# Patient Record
Sex: FEMALE | Race: WHITE | NOT HISPANIC OR LATINO | Employment: PART TIME | URBAN - METROPOLITAN AREA
[De-identification: names, ages, dates, MRNs, and addresses within clinical notes are randomized per-mention and may not be internally consistent; named-entity substitution may affect disease eponyms.]

---

## 2018-09-05 PROBLEM — Z34.90 TERM PREGNANCY: Status: ACTIVE | Noted: 2018-09-05

## 2018-09-05 PROBLEM — Z67.40 TYPE O BLOOD, RH POSITIVE: Status: ACTIVE | Noted: 2018-09-05

## 2018-09-06 PROBLEM — Z34.90 PREGNANCY: Status: ACTIVE | Noted: 2018-09-06

## 2018-10-09 ENCOUNTER — HOSPITAL ENCOUNTER (OUTPATIENT)
Dept: PREADMISSION TESTING | Facility: HOSPITAL | Age: 28
Discharge: HOME | End: 2018-10-09
Attending: SURGERY
Payer: MEDICAID

## 2018-10-09 ENCOUNTER — ANESTHESIA EVENT (OUTPATIENT)
Dept: SURGERY | Facility: HOSPITAL | Age: 28
End: 2018-10-09
Payer: MEDICAID

## 2018-10-09 VITALS — BODY MASS INDEX: 35.3 KG/M2 | HEIGHT: 61 IN | WEIGHT: 187 LBS

## 2018-10-09 NOTE — DISCHARGE INSTRUCTIONS
[facility]  Pre Admit Instructions    Day and Date of Procedure: Wednesday 10-10-18  Arrival time: 8am      · Call your doctor if you become ill before your surgery       - 7 a.m. To 5 p.m. Enter through Patient Registration Main Lobby  · You must have a responsible  to bring you home    Do NOT eat or drink anything   past midnight before your procedure day    Please    · Do not wear makeup, jewelry, nail polish or body piercings  · Bring containers/solution for contacts, dentures, bridges - these and hearing aids will be removed before your procedure  · Do not bring cash, jewelry or valuables the day of your procedure   · No smoking at least 24 hours before your procedure  · Wear clothing that is comfortable and easy to take off and put on  · Do NOT shave for at least 5 days before your surgery    Review skin preparation handout before using. Shower with Hibiclens the Night before the procedure. Bring remaining Hibiclens with you the morning of surgery.                Information about your stay (Please Review)    Before Surgery  1. Cafeteria Meals: 7am to 10am; 11am to 1:30 pm; Dinner/Supper must may be ordered between 11:00 am and 4 pm from the [facility] After [employer] Menu. Food will be available to  between 5 pm and 6 pm. The kitchen phone extension is [phone].  2. Your doctor may order and review labs, x-rays, ECG or other tests as a pre-surgery workup and will call you if there is need for follow up.  3. No smoking inside or outside the hospital on hospital grounds.  4. Wear clothing that is easy to take off and put on.  The hospital will provide you with a gown.  5. You may bring robe, slippers, nightwear, and toiletries (toothbrush, toothpaste, makeup).  6. If your doctor orders a Fleets Enema or other prep, follow package and/or doctors orders.  7. Brush your teeth and rinse your mouth the morning of surgery, but dont swallow the water.  8. The nurse will ask questions and check your  condition.  The doctor may mariama your surgical site.  9. Compression boots may be put on your calves to reduce the risk of blood clots.  10. The doctor may order medicine to help you relax before surgery.  After Surgery  1. The nurse will check your temperature, breathing, blood pressure, heart rate, IV site, and surgery site.  2. A diet will be ordered-most start with ice chips and then advance slowly to other foods.  3. If you have IV fluids the IV pump will beep to let the nurse know that she needs to check it.  4. You may have a urinary catheter and staff may measure your oral intake and urine output.  5. Pain medication may be ordered by the doctor after surgery.  If you have a pain management device tell your caretakers not to press the button because of OVERDOSE RISK.  6. When the nurse or doctor tells you it is okay to get out of bed, ask for help until you are stable.  7. The nurse may ask you to turn, cough, and deep breathe to prevent lung problems.  You can use a pillow to hold your incision when you deep breathe or cough to reduce pain.  8. The nurse will give you discharge instructions--incision care, symptoms to report to your doctor, and your follow-up appointment when you are discharged.  You cannot drive yourself home.  Goal for Discharge from One Day Surgery  · Control pain with an oral medication  · Walk without feeling dizzy or weak  · Tolerate liquids well  · Urinate without difficulty    Things you can do to  Reduce the Risk of Infections or Complications  Wash Hands and use Waterless Hand Sanitizers  · Wash hands frequently with soap and warm water for at least 15 seconds.   · Use hand sanitizers (alcohol based) often at home and in public if hands are not visibly soiled  Take Antibiotic Exactly as Prescribed  · Do not stop antibiotics too soon; you risk developing infection resistant to antibiotics  · Take your antibiotic even if you are feeling better and even if they upset your  stomach  · Call the doctor if you cant tolerate the antibiotic or you have an allergic reaction  Stay Healthy  · Take medicines as prescribed by your doctor  · Keep your diabetes under control - diet and medication  · Get enough rest, exercise and eat a healthy diet  Keep the Wound Clean and Dry  · Wash hands before and after taking care of the incision (cut)  · Wash hands when you remove a dressing, before you touch/apply a new dressing  · Shower and clean incision with antibacterial soap and rinse well if the doctor approves  · Allow the cut to dry completely before putting on a clean dressing  · Do not touch the part of the bandage that will cover the incision  · Do not use ointments unless your doctor tells you to-can promote bacterial growth  · If ordered, put ointment directly on the dressing-do not touch the end of the tube  · Do not scrub, remove scabs, or leave a damp dressing on the incision  · Do not use peroxide or alcohol to clean the incision unless the doctor tells you to   · Do not let children, pets or anyone else contaminate the incision  Stop Smoking To Prevent Infection  · Stop smoking-Centers for Disease Control recommends 30 days before surgery  · Smokers get more infections after surgery-studies have shown 6 times the risk  · Smokers have more scarring and heal slower-open wounds get infected easier  Prevent Respiratory complications  · Stop smoking  · Turn, cough, and deep breathe even if you have some pain when you do so.  · Splint your incision with a pillow when you cough/deep breath, to help control pain.  · Do not lie in one position for long periods of time.   Prevent Blood Clots  · When you wake move your legs, flex your feet, rotate your ankles, wiggle your toes  · Get up when the doctor says its ok.  Dangle your feet from the side of the bed  · Report symptoms-leg pain, redness/swelling, warm to touch; fever; shortness of breath, chest pain, severe upper back pain.

## 2018-10-09 NOTE — ANESTHESIA PREPROCEDURE EVALUATION
10/09/2018  Umm Burks is a 28 y.o., female.    Anesthesia Evaluation    I have reviewed the Patient Summary Reports.    I have reviewed the Nursing Notes.   I have reviewed the Medications.     Review of Systems  Anesthesia Hx:  No problems with previous Anesthesia  History of prior surgery of interest to airway management or planning: Previous anesthesia: General  Denies Personal Hx of Anesthesia complications.   Social:  Non-Smoker, No Alcohol Use    Hematology/Oncology:  Hematology Normal   Oncology Normal     EENT/Dental:EENT/Dental Normal   Cardiovascular:  Cardiovascular Normal Exercise tolerance: good     Pulmonary:  Pulmonary Normal    Renal/:   renal calculi    Hepatic/GI:   GERD, poorly controlled Gall stones   Musculoskeletal:   Arthritis  OA knee   Neurological:  Neurology Normal    Endocrine:  Endocrine Normal    Dermatological:  Skin Normal    Psych:  Psychiatric Normal           Physical Exam  General:  Obesity    Airway/Jaw/Neck:  Airway Findings: Mouth Opening: Normal Tongue: Normal  General Airway Assessment: Adult  Mallampati: II  TM Distance: Normal, at least 6 cm  Jaw/Neck Findings:     Neck ROM: Normal ROM      Dental:  Dental Findings: In tact        Mental Status:  Mental Status Findings:  Cooperative, Alert and Oriented         Anesthesia Plan  Type of Anesthesia, risks & benefits discussed:  Anesthesia Type:  general  Patient's Preference:   Intra-op Monitoring Plan: standard ASA monitors  Intra-op Monitoring Plan Comments:   Post Op Pain Control Plan: multimodal analgesia  Post Op Pain Control Plan Comments:   Induction:   IV  Beta Blocker:  Patient is not currently on a Beta-Blocker (No further documentation required).       Informed Consent: Patient understands risks and agrees with Anesthesia plan.  Questions answered. Anesthesia consent signed with patient.  ASA Score: 2      Day of Surgery Review of History & Physical: I have interviewed and examined the patient. I have reviewed the patient's H&P dated: 10/10/18. There are no significant changes.  H&P update referred to the surgeon.     Anesthesia Plan Notes: Pt admits to severe GERD, suggest RSI        Ready For Surgery From Anesthesia Perspective.

## 2018-10-10 ENCOUNTER — HOSPITAL ENCOUNTER (OUTPATIENT)
Facility: HOSPITAL | Age: 28
Discharge: HOME | End: 2018-10-10
Attending: SURGERY | Admitting: SURGERY
Payer: MEDICAID

## 2018-10-10 ENCOUNTER — ANESTHESIA (OUTPATIENT)
Dept: SURGERY | Facility: HOSPITAL | Age: 28
End: 2018-10-10
Payer: MEDICAID

## 2018-10-10 VITALS
OXYGEN SATURATION: 97 % | RESPIRATION RATE: 18 BRPM | TEMPERATURE: 97 F | HEIGHT: 61 IN | WEIGHT: 184.88 LBS | SYSTOLIC BLOOD PRESSURE: 112 MMHG | HEART RATE: 64 BPM | DIASTOLIC BLOOD PRESSURE: 65 MMHG | BODY MASS INDEX: 34.91 KG/M2

## 2018-10-10 DIAGNOSIS — K81.1 CHRONIC CHOLECYSTITIS: Primary | ICD-10-CM

## 2018-10-10 LAB — B-HCG UR QL: NEGATIVE

## 2018-10-10 PROCEDURE — 00790 ANES IPER UPR ABD NOS: CPT | Performed by: SURGERY

## 2018-10-10 PROCEDURE — 37000008 HC ANESTHESIA 1ST 15 MINUTES: Performed by: SURGERY

## 2018-10-10 PROCEDURE — 63600175 PHARM REV CODE 636 W HCPCS: Performed by: SURGERY

## 2018-10-10 PROCEDURE — 90471 IMMUNIZATION ADMIN: CPT | Performed by: SURGERY

## 2018-10-10 PROCEDURE — 37000009 HC ANESTHESIA EA ADD 15 MINS: Performed by: SURGERY

## 2018-10-10 PROCEDURE — 88304 TISSUE EXAM BY PATHOLOGIST: CPT | Performed by: PATHOLOGY

## 2018-10-10 PROCEDURE — 81025 URINE PREGNANCY TEST: CPT

## 2018-10-10 PROCEDURE — 25000003 PHARM REV CODE 250: Performed by: SURGERY

## 2018-10-10 PROCEDURE — 36000708 HC OR TIME LEV III 1ST 15 MIN: Performed by: SURGERY

## 2018-10-10 PROCEDURE — 25000003 PHARM REV CODE 250

## 2018-10-10 PROCEDURE — 36000709 HC OR TIME LEV III EA ADD 15 MIN: Performed by: SURGERY

## 2018-10-10 PROCEDURE — 00790 ANES IPER UPR ABD NOS: CPT | Mod: QZ | Performed by: NURSE ANESTHETIST, CERTIFIED REGISTERED

## 2018-10-10 PROCEDURE — 90686 IIV4 VACC NO PRSV 0.5 ML IM: CPT | Performed by: SURGERY

## 2018-10-10 PROCEDURE — 71000033 HC RECOVERY, INTIAL HOUR: Performed by: SURGERY

## 2018-10-10 PROCEDURE — 27201423 OPTIME MED/SURG SUP & DEVICES STERILE SUPPLY: Performed by: SURGERY

## 2018-10-10 PROCEDURE — 88304 TISSUE EXAM BY PATHOLOGIST: CPT | Mod: 26,,, | Performed by: PATHOLOGY

## 2018-10-10 PROCEDURE — 63600175 PHARM REV CODE 636 W HCPCS

## 2018-10-10 RX ORDER — SODIUM CHLORIDE, SODIUM LACTATE, POTASSIUM CHLORIDE, CALCIUM CHLORIDE 600; 310; 30; 20 MG/100ML; MG/100ML; MG/100ML; MG/100ML
INJECTION, SOLUTION INTRAVENOUS CONTINUOUS PRN
Status: DISCONTINUED | OUTPATIENT
Start: 2018-10-10 | End: 2018-10-10

## 2018-10-10 RX ORDER — CEFAZOLIN SODIUM 2 G/50ML
2 SOLUTION INTRAVENOUS
Status: COMPLETED | OUTPATIENT
Start: 2018-10-10 | End: 2018-10-10

## 2018-10-10 RX ORDER — PROPOFOL 10 MG/ML
VIAL (ML) INTRAVENOUS
Status: DISCONTINUED | OUTPATIENT
Start: 2018-10-10 | End: 2018-10-10

## 2018-10-10 RX ORDER — ACETAMINOPHEN 10 MG/ML
INJECTION, SOLUTION INTRAVENOUS
Status: DISCONTINUED | OUTPATIENT
Start: 2018-10-10 | End: 2018-10-10

## 2018-10-10 RX ORDER — ROCURONIUM BROMIDE 10 MG/ML
INJECTION, SOLUTION INTRAVENOUS
Status: DISCONTINUED | OUTPATIENT
Start: 2018-10-10 | End: 2018-10-10

## 2018-10-10 RX ORDER — SODIUM CHLORIDE 9 MG/ML
INJECTION, SOLUTION INTRAVENOUS CONTINUOUS
Status: DISCONTINUED | OUTPATIENT
Start: 2018-10-10 | End: 2018-10-10 | Stop reason: HOSPADM

## 2018-10-10 RX ORDER — ONDANSETRON HYDROCHLORIDE 2 MG/ML
INJECTION, SOLUTION INTRAMUSCULAR; INTRAVENOUS
Status: DISCONTINUED | OUTPATIENT
Start: 2018-10-10 | End: 2018-10-10

## 2018-10-10 RX ORDER — GLYCOPYRROLATE 0.2 MG/ML
INJECTION INTRAMUSCULAR; INTRAVENOUS
Status: DISCONTINUED | OUTPATIENT
Start: 2018-10-10 | End: 2018-10-10

## 2018-10-10 RX ORDER — LIDOCAINE HYDROCHLORIDE 10 MG/ML
1 INJECTION, SOLUTION EPIDURAL; INFILTRATION; INTRACAUDAL; PERINEURAL ONCE
Status: DISCONTINUED | OUTPATIENT
Start: 2018-10-10 | End: 2018-10-10 | Stop reason: HOSPADM

## 2018-10-10 RX ORDER — LIDOCAINE HYDROCHLORIDE 20 MG/ML
INJECTION, SOLUTION EPIDURAL; INFILTRATION; INTRACAUDAL; PERINEURAL
Status: DISCONTINUED | OUTPATIENT
Start: 2018-10-10 | End: 2018-10-10

## 2018-10-10 RX ORDER — BUPIVACAINE HYDROCHLORIDE AND EPINEPHRINE 5; 5 MG/ML; UG/ML
INJECTION, SOLUTION EPIDURAL; INTRACAUDAL; PERINEURAL
Status: DISCONTINUED | OUTPATIENT
Start: 2018-10-10 | End: 2018-10-10 | Stop reason: HOSPADM

## 2018-10-10 RX ORDER — FENTANYL CITRATE 50 UG/ML
INJECTION, SOLUTION INTRAMUSCULAR; INTRAVENOUS
Status: DISCONTINUED | OUTPATIENT
Start: 2018-10-10 | End: 2018-10-10

## 2018-10-10 RX ORDER — NEOSTIGMINE METHYLSULFATE 5 MG/5 ML
SYRINGE (ML) INTRAVENOUS
Status: DISCONTINUED | OUTPATIENT
Start: 2018-10-10 | End: 2018-10-10

## 2018-10-10 RX ORDER — HYDROCODONE BITARTRATE AND ACETAMINOPHEN 5; 325 MG/1; MG/1
TABLET ORAL
Qty: 61 TABLET | Refills: 0 | Status: SHIPPED | OUTPATIENT
Start: 2018-10-10 | End: 2022-01-18

## 2018-10-10 RX ORDER — SUCCINYLCHOLINE CHLORIDE 20 MG/ML
INJECTION INTRAMUSCULAR; INTRAVENOUS
Status: DISCONTINUED | OUTPATIENT
Start: 2018-10-10 | End: 2018-10-10

## 2018-10-10 RX ADMIN — PROPOFOL 150 MG: 10 INJECTION, EMULSION INTRAVENOUS at 10:10

## 2018-10-10 RX ADMIN — ACETAMINOPHEN 1000 MG: 10 INJECTION, SOLUTION INTRAVENOUS at 10:10

## 2018-10-10 RX ADMIN — FENTANYL CITRATE 50 MCG: 50 INJECTION, SOLUTION INTRAMUSCULAR; INTRAVENOUS at 10:10

## 2018-10-10 RX ADMIN — GLYCOPYRROLATE 0.4 MG: 0.2 INJECTION INTRAMUSCULAR; INTRAVENOUS at 10:10

## 2018-10-10 RX ADMIN — INFLUENZA A VIRUS A/MICHIGAN/45/2015 X-275 (H1N1) ANTIGEN (FORMALDEHYDE INACTIVATED), INFLUENZA A VIRUS A/SINGAPORE/INFIMH-16-0019/2016 IVR-186 (H3N2) ANTIGEN (FORMALDEHYDE INACTIVATED), INFLUENZA B VIRUS B/PHUKET/3073/2013 ANTIGEN (FORMALDEHYDE INACTIVATED), AND INFLUENZA B VIRUS B/MARYLAND/15/2016 BX-69A ANTIGEN (FORMALDEHYDE INACTIVATED) 0.5 ML: 15; 15; 15; 15 INJECTION, SUSPENSION INTRAMUSCULAR at 01:10

## 2018-10-10 RX ADMIN — LIDOCAINE HYDROCHLORIDE 80 MG: 20 INJECTION, SOLUTION EPIDURAL; INFILTRATION; INTRACAUDAL; PERINEURAL at 10:10

## 2018-10-10 RX ADMIN — ROCURONIUM BROMIDE 20 MG: 10 INJECTION, SOLUTION INTRAVENOUS at 10:10

## 2018-10-10 RX ADMIN — CEFAZOLIN SODIUM 2 G: 2 SOLUTION INTRAVENOUS at 10:10

## 2018-10-10 RX ADMIN — ROCURONIUM BROMIDE 5 MG: 10 INJECTION, SOLUTION INTRAVENOUS at 10:10

## 2018-10-10 RX ADMIN — SODIUM CHLORIDE, SODIUM LACTATE, POTASSIUM CHLORIDE, AND CALCIUM CHLORIDE: .6; .31; .03; .02 INJECTION, SOLUTION INTRAVENOUS at 10:10

## 2018-10-10 RX ADMIN — SUCCINYLCHOLINE CHLORIDE 120 MG: 20 INJECTION, SOLUTION INTRAMUSCULAR; INTRAVENOUS at 10:10

## 2018-10-10 RX ADMIN — ONDANSETRON 8 MG: 2 INJECTION, SOLUTION INTRAMUSCULAR; INTRAVENOUS at 10:10

## 2018-10-10 RX ADMIN — SODIUM CHLORIDE: 0.9 INJECTION, SOLUTION INTRAVENOUS at 08:10

## 2018-10-10 RX ADMIN — Medication 3 MG: at 10:10

## 2018-10-10 NOTE — H&P
[facility]  General Surgery  History & Physical    Patient Name: Umm Burks  MRN: [id number]  Admission Date: 10/10/2018  Attending Physician: Juan Horn MD   Primary Care Provider: Primary Doctor No    Patient information was obtained from patient and past medical records.     Subjective:     Chief Complaint/Reason for Admission: Chronic cholecystitis    History of Present Illness:  Patient is a 28 y.o. female presents with right upper quadrant pain that was bothering her during pregnancy. She had imaging that showed stones. She has current symptoms after delivery, nausea, but no vomiting. Food makes it worse.     No current facility-administered medications on file prior to encounter.      Current Outpatient Medications on File Prior to Encounter   Medication Sig    chlorhexidine (PERIDEX) 0.12 % solution Use as directed 15 mLs in the mouth or throat 2 (two) times daily.    PNV,calcium 72-iron-folic acid 27 mg iron- 1 mg Tab Take 1 tablet by mouth once daily.    acetaminophen (TYLENOL) 325 MG tablet Take 325 mg by mouth every 6 (six) hours as needed for Pain.    cetirizine (ZYRTEC) 10 MG tablet Take 10 mg by mouth once daily.    fexofenadine (ALLEGRA) 180 MG tablet Take 180 mg by mouth once daily.    omeprazole (PRILOSEC) 20 MG capsule Take 20 mg by mouth once daily.       Review of patient's allergies indicates:  No Known Allergies    Past Medical History:   Diagnosis Date    Acid reflux     Kidney stones     age 16    Lumbar back pain     pt has a extra Lumbar 6 Vertebrae    OA (osteoarthritis) of knee      Past Surgical History:   Procedure Laterality Date    KIDNEY STONE SURGERY      left stent placed    WISDOM TOOTH EXTRACTION       Family History     None        Tobacco Use    Smoking status: Never Smoker    Smokeless tobacco: Never Used   Substance and Sexual Activity    Alcohol use: No    Drug use: No    Sexual activity: Yes     Review of Systems   Constitutional:  Negative.    Respiratory: Negative.    Cardiovascular: Negative.    Gastrointestinal: Positive for abdominal distention, abdominal pain and nausea.   Skin: Negative.      Objective:     Vital Signs (Most Recent):  Temp: 98.4 °F (36.9 °C) (10/10/18 0851)  Pulse: 78 (10/10/18 0851)  Resp: 16 (10/10/18 0851)  BP: 134/70 (10/10/18 0851)  SpO2: 99 % (10/10/18 0851) Vital Signs (24h Range):  Temp:  [98.4 °F (36.9 °C)] 98.4 °F (36.9 °C)  Pulse:  [78] 78  Resp:  [16] 16  SpO2:  [99 %] 99 %  BP: (134)/(70) 134/70     Weight: 83.9 kg (184 lb 14.4 oz)  Body mass index is 34.94 kg/m².    Physical Exam   Constitutional: She appears well-developed and well-nourished. No distress.   Pulmonary/Chest: Effort normal and breath sounds normal. No stridor. No respiratory distress.   Abdominal: Soft. Bowel sounds are normal. She exhibits no distension. There is no tenderness.   Skin: Skin is warm and dry. She is not diaphoretic. No erythema. No pallor.       Significant Labs:  CBC: No results for input(s): WBC, RBC, HGB, HCT, PLT, MCV, MCH, MCHC in the last 168 hours.  BMP: No results for input(s): GLU, NA, K, CL, CO2, BUN, CREATININE, CALCIUM, MG in the last 168 hours.    Significant Diagnostics:  I have reviewed and interpreted all pertinent imaging results/findings within the past 24 hours.    Assessment/Plan:     Active Diagnoses:    Diagnosis Date Noted POA    Chronic cholecystitis [K81.1] 10/10/2018 Yes      Problems Resolved During this Admission:     VTE Risk Mitigation (From admission, onward)        Ordered     IP VTE LOW RISK PATIENT  Once      10/10/18 0840     Place sequential compression device  Until discontinued      10/10/18 0840        To OR for lap brenda today.     Juan Horn MD  General Surgery  [facility]

## 2018-10-10 NOTE — DISCHARGE SUMMARY
[facility]  [facility] Surgery  Discharge Note  Short Stay    Admit Date: 10/10/2018    Discharge Date and Time: 10/10/2018    Attending Physician: Juan Horn MD     Discharge Provider: Juan Horn    Diagnoses:  Active Hospital Problems    Diagnosis  POA    *Chronic cholecystitis [K81.1]  Yes      Resolved Hospital Problems   No resolved problems to display.       Discharged Condition: Good.    Hospital Course: Patient was admitted for an outpatient procedure and tolerated the procedure well with no complications.    Final Diagnoses: Same as principal diagnosis.    Disposition: Home or Self Care.     Follow up/Patient Instructions:    Medications:  Reconciled Home Medications:      Medication List      START taking these medications    HYDROcodone-acetaminophen 5-325 mg per tablet  Commonly known as:  NORCO  Take 1-2 every 6 hours prn pain.        CONTINUE taking these medications    acetaminophen 325 MG tablet  Commonly known as:  TYLENOL  Take 325 mg by mouth every 6 (six) hours as needed for Pain.     cetirizine 10 MG tablet  Commonly known as:  ZYRTEC  Take 10 mg by mouth once daily.     chlorhexidine 0.12 % solution  Commonly known as:  PERIDEX  Use as directed 15 mLs in the mouth or throat 2 (two) times daily.     fexofenadine 180 MG tablet  Commonly known as:  ALLEGRA  Take 180 mg by mouth once daily.     omeprazole 20 MG capsule  Commonly known as:  PRILOSEC  Take 20 mg by mouth once daily.     PNV,calcium 72-iron-folic acid 27 mg iron- 1 mg Tab  Commonly known as:  PRENATAL VITAMIN PLUS LOW IRON  Take 1 tablet by mouth once daily.          Discharge Procedure Orders   Diet general     Call MD for:  temperature >100.4     Call MD for:  persistent nausea and vomiting     Call MD for:  severe uncontrolled pain     Call MD for:  redness, tenderness, or signs of infection (pain, swelling, redness, odor or green/yellow discharge around incision site)     Remove dressing in 24 hours      Follow-up Information     Juan Horn MD.    Specialties:  General Surgery, Wound Care  Why:  Outpatient Services  Contact information:  [address] [facility] [address] [phone]

## 2018-10-10 NOTE — ANESTHESIA POSTPROCEDURE EVALUATION
"Anesthesia Post Evaluation    Patient: Umm Burks    Procedure(s) Performed: Procedure(s) (LRB):  CHOLECYSTECTOMY, LAPAROSCOPIC (N/A)    Final Anesthesia Type: general  Patient location during evaluation: PACU  Patient participation: Yes- Able to Participate  Level of consciousness: awake and alert and oriented  Post-procedure vital signs: reviewed and stable  Pain management: adequate  Airway patency: patent  PONV status at discharge: No PONV  Anesthetic complications: no      Cardiovascular status: blood pressure returned to baseline and hemodynamically stable  Respiratory status: unassisted, spontaneous ventilation and room air  Hydration status: euvolemic  Follow-up not needed.        Visit Vitals  /66 (BP Location: Right arm, Patient Position: Lying)   Pulse 66   Temp 36.1 °C (97 °F)   Resp 18   Ht 5' 1" (1.549 m)   Wt 83.9 kg (184 lb 14.4 oz)   SpO2 (!) 94%   Breastfeeding? Yes   BMI 34.94 kg/m²       Pain/Silvestre Score: Pain Assessment Performed: Yes (10/10/2018 11:04 AM)  Presence of Pain: denies (10/10/2018 11:04 AM)  Silvestre Score: 10 (10/10/2018 11:04 AM)        "

## 2018-10-10 NOTE — PLAN OF CARE
Problem: Patient Care Overview  Goal: Plan of Care Review  Outcome: Outcome(s) achieved Date Met: 10/10/18  Patient aware of plan of care. VS stable. Afebrile. Patient tolerating diet. No N/V. Patient voided without difficulty. Free from falls/injuries. No questions or concerns at this time. Agrees with plan of care.

## 2018-10-10 NOTE — TRANSFER OF CARE
"Anesthesia Transfer of Care Note    Patient: Umm Burks    Procedure(s) Performed: Procedure(s) (LRB):  CHOLECYSTECTOMY, LAPAROSCOPIC (N/A)    Patient location: PACU    Anesthesia Type: general    Transport from OR: Transported from OR on room air with adequate spontaneous ventilation    Post pain: adequate analgesia    Post assessment: no apparent anesthetic complications and tolerated procedure well    Post vital signs: stable    Level of consciousness: sedated    Nausea/Vomiting: no nausea/vomiting    Complications: none    Transfer of care protocol was followed      Last vitals:   Visit Vitals  /62 (BP Location: Right arm, Patient Position: Lying)   Pulse 83   Temp 36.1 °C (97 °F)   Resp 18   Ht 5' 1" (1.549 m)   Wt 83.9 kg (184 lb 14.4 oz)   SpO2 (!) 93%   Breastfeeding? Yes   BMI 34.94 kg/m²     "

## 2018-10-10 NOTE — INTERVAL H&P NOTE
The patient has been examined and the H&P has been reviewed:        I concur with the findings and no changes have occurred since H&P was written.        Patient cleared for Anesthesia: General        Anesthesia/Surgery risks, benefits and alternative options discussed and understood by patient/family.      Active Hospital Problems    Diagnosis  POA    Chronic cholecystitis [K81.1]  Yes      Resolved Hospital Problems   No resolved problems to display.

## 2018-10-10 NOTE — PROGRESS NOTES
Staff Handoff    Patient up to room 320W via self per PCT. Mother at bedside. Patient became agitated when learning she did not have a private room. She stated she was told she would have a private room. HS notified. Explained that there are no private rooms available at this time, will try to accommodate after surgery. VS stable. Afebrile. Chlorhexidine bath done. SCD's placed.Call bell in reach. Oriented to room environment. Will continue to monitor for any change in status.   Resident Handoff

## 2018-10-10 NOTE — OP NOTE
DATE OF PROCEDURE: 10/10/2018     PREOPERATIVE DIAGNOSES:   Chronic cholecystitis [K81.1]    POSTOPERATIVE DIAGNOSES:   Post-Op Diagnosis Codes:     * Chronic cholecystitis [K81.1]    PROCEDURES PERFORMED:   Procedure(s) (LRB):  CHOLECYSTECTOMY, LAPAROSCOPIC (N/A)    Surgeon(s) and Role:     * Juan Horn MD - Primary    ANESTHESIA: General    ESTIMATED BLOOD LOSS: 5 cc    SPECIMEN:   Specimen (12h ago, onward)    Start     Ordered    10/10/18 1020  Specimen to Pathology - Surgery  Once     Comments:  Pre-op:chronic cholecystitisPost-op:sameProcedure:lap-choleSpecimen:gallbladder with stonesPhysician: Justina     Start Status   10/10/18 1020 Collected (10/10/18 1029)       10/10/18 1029          CLINICAL HISTORY AND INDICATION FOR SURGERY: Umm Burks is a 28 y.o. female with gallstones.     PROCEDURE IN DETAIL: The patient was explained the risks and benefits of the procedure. They agreed to the procedure. They were taken to the operating room on 10/10/2018. The patient was placed on the OR table in the supine position. The abdomen was prepped and draped in a sterile manner. An 11 blade was used to make an incision above the umbilicus. A veress needle was inserted. Pneumoperitoneum was established. A 5 mm Optiview port was placed without difficulty. 3 more working ports were placed, one subxiphoid, two over the gallbladder. The gallbladder was noted to be mildly inflamed. The gallbladder was grasped. Blunt dissection was used to create a critical view. The cystic duct was clipped twice distally once proximally and divided. The cystic artery was clipped twice distally and one proximally and divided. The gallbladder was removed off the liver with Bovie. The gallbladder was removed from the abdomen using an Endocatch bag. The right upper quadrant was inspected for hemostasis. Hemostasis was excellent.The right upper quadrant was copiously irrigated. All ports were removed under laparoscopic vision. All port sites  closed at skin with interrupted suture. Sterile dressing was placed. The procedure was tolerated well. The patient was brought back to recovery in stable condition. All instrument and sponge counts were correct x 2.

## 2018-10-17 NOTE — PHYSICIAN QUERY
PT Name: Umm Burks  MR #: [id number]     Physician Query Form - Documentation Clarification      CDS/: Tri Navarrete               Contact information: [email or web address]    This form is a permanent document in the medical record.     Query Date: October 17, 2018    By submitting this query, we are merely seeking further clarification of documentation. Please utilize your independent clinical judgment when addressing the question(s) below.    The Medical record reflects the following:    Supporting Clinical Findings Location in Medical Record   Patient is a 28 y.o. female presents with right upper quadrant pain that was bothering her during pregnancy.  History and Physical                                                                            Dr. Horn,    The postpartum period begins immediately after delivery and continues for six weeks following delivery. Please clarify if the calculus of the gallbladder with chronic cholecystitis occurred in or after postpartum period.     [ x  ] Calculus of gallbladder with chronic cholecystitis occurred in postpartum period    [  ] Calculus of gallbladder with chronic cholecystitis occurred after postpartum period    [  ] Unable to determine

## 2021-05-10 ENCOUNTER — PATIENT MESSAGE (OUTPATIENT)
Dept: RESEARCH | Facility: HOSPITAL | Age: 31
End: 2021-05-10

## 2022-01-24 ENCOUNTER — PATIENT MESSAGE (OUTPATIENT)
Dept: ADMINISTRATIVE | Facility: HOSPITAL | Age: 32
End: 2022-01-24
Payer: MEDICAID

## 2022-04-04 ENCOUNTER — PATIENT MESSAGE (OUTPATIENT)
Dept: ADMINISTRATIVE | Facility: HOSPITAL | Age: 32
End: 2022-04-04
Payer: MEDICAID

## 2022-07-11 ENCOUNTER — PATIENT MESSAGE (OUTPATIENT)
Dept: ADMINISTRATIVE | Facility: HOSPITAL | Age: 32
End: 2022-07-11
Payer: MEDICAID

## 2022-10-03 ENCOUNTER — PATIENT MESSAGE (OUTPATIENT)
Dept: ADMINISTRATIVE | Facility: HOSPITAL | Age: 32
End: 2022-10-03
Payer: MEDICAID

## 2023-01-09 ENCOUNTER — PATIENT MESSAGE (OUTPATIENT)
Dept: ADMINISTRATIVE | Facility: HOSPITAL | Age: 33
End: 2023-01-09
Payer: MEDICAID

## 2023-04-03 ENCOUNTER — PATIENT MESSAGE (OUTPATIENT)
Dept: ADMINISTRATIVE | Facility: HOSPITAL | Age: 33
End: 2023-04-03
Payer: MEDICAID

## (undated) DEVICE — APPLIER CLIP ENDO MED/LG 10MM

## (undated) DEVICE — BAG TISS RETRV MONARCH 10MM

## (undated) DEVICE — IRRIGATOR ENDOSCOPY DISP.

## (undated) DEVICE — SCISSOR 5MMX35CM DIRECT DRIVE